# Patient Record
Sex: MALE | Race: WHITE | NOT HISPANIC OR LATINO | Employment: FULL TIME | URBAN - METROPOLITAN AREA
[De-identification: names, ages, dates, MRNs, and addresses within clinical notes are randomized per-mention and may not be internally consistent; named-entity substitution may affect disease eponyms.]

---

## 2017-03-13 ENCOUNTER — HOSPITAL ENCOUNTER (EMERGENCY)
Facility: MEDICAL CENTER | Age: 52
End: 2017-03-13
Attending: EMERGENCY MEDICINE
Payer: MEDICAID

## 2017-03-13 ENCOUNTER — APPOINTMENT (OUTPATIENT)
Dept: RADIOLOGY | Facility: MEDICAL CENTER | Age: 52
End: 2017-03-13
Attending: EMERGENCY MEDICINE
Payer: MEDICAID

## 2017-03-13 VITALS
BODY MASS INDEX: 32.79 KG/M2 | TEMPERATURE: 97.6 F | DIASTOLIC BLOOD PRESSURE: 80 MMHG | SYSTOLIC BLOOD PRESSURE: 136 MMHG | HEART RATE: 72 BPM | WEIGHT: 229.06 LBS | HEIGHT: 70 IN | OXYGEN SATURATION: 96 % | RESPIRATION RATE: 16 BRPM

## 2017-03-13 DIAGNOSIS — S06.0X0A CONCUSSION, WITHOUT LOSS OF CONSCIOUSNESS, INITIAL ENCOUNTER: ICD-10-CM

## 2017-03-13 PROCEDURE — 700101 HCHG RX REV CODE 250: Performed by: EMERGENCY MEDICINE

## 2017-03-13 PROCEDURE — 304999 HCHG REPAIR-SIMPLE/INTERMED LEVEL 1

## 2017-03-13 PROCEDURE — 70450 CT HEAD/BRAIN W/O DYE: CPT

## 2017-03-13 PROCEDURE — 303747 HCHG EXTRA SUTURE

## 2017-03-13 PROCEDURE — 304217 HCHG IRRIGATION SYSTEM

## 2017-03-13 PROCEDURE — 303485 HCHG DRESSING MEDIUM

## 2017-03-13 PROCEDURE — 99283 EMERGENCY DEPT VISIT LOW MDM: CPT

## 2017-03-13 RX ORDER — LIDOCAINE HYDROCHLORIDE AND EPINEPHRINE BITARTRATE 20; .01 MG/ML; MG/ML
10 INJECTION, SOLUTION SUBCUTANEOUS ONCE
Status: COMPLETED | OUTPATIENT
Start: 2017-03-13 | End: 2017-03-13

## 2017-03-13 RX ADMIN — LIDOCAINE HYDROCHLORIDE AND EPINEPHRINE 10 ML: 20; 10 INJECTION, SOLUTION INFILTRATION; PERINEURAL at 10:08

## 2017-03-13 ASSESSMENT — PAIN SCALES - GENERAL
PAINLEVEL_OUTOF10: 3
PAINLEVEL_OUTOF10: 0
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
## 2017-03-13 NOTE — ED AVS SNAPSHOT
Home Care Instructions                                                                                                                Saad Martin   MRN: [id number]    Department:  [facility], Emergency Dept   Date of Visit:  3/13/2017            [facility], Emergency Dept    [address]    [place] [address]    Phone:  [phone]      You were seen by     Kurtis Major M.D.      Your Diagnosis Was     Concussion, without loss of consciousness, initial encounter     S06.0X0A       Follow-up Information     1. Follow up with Pcp Pt States None. Schedule an appointment as soon as possible for a visit in 10 days.    Specialty:  Family Medicine    Why:  to remove sutures      Medication Information     Review all of your home medications and newly ordered medications with your primary doctor and/or pharmacist as soon as possible. Follow medication instructions as directed by your doctor and/or pharmacist.     Please keep your complete medication list with you and share with your physician. Update the information when medications are discontinued, doses are changed, or new medications (including over-the-counter products) are added; and carry medication information at all times in the event of emergency situations.               Medication List      Notice     You have not been prescribed any medications.            Procedures and tests performed during your visit     Procedure/Test Number of Times Performed    CT-HEAD W/O 1    NURSING COMMUNICATION 2        Discharge Instructions       Laceration Care    Keep wound dry 2 days, bandage with antibiotic ointment 3 days, keep it clean afterwards and protect the wound from sunburn 9 months.  See your physician, go to urgent care or return for infection and to remove running sutures/staples in 10-14 days. Avoid repeat concussion until all headache and concentration symptoms have resolved.    A laceration is a cut or  lesion that goes through all layers of the skin and into the tissue just beneath the skin.    This has been repaired by your caregiver. Your caregiver used either suturing, stapling, or steri-strips to repair the laceration. This brings the skin margins together. This allows faster healing.    HOME CARE INSTRUCTIONS  Ø If you were given a dressing, you should change it at least once a day, or as instructed by your caregiver. If the bandage sticks, soak it off with a solution of hydrogen peroxide or soapy water.  Ø Twice a day, wash the area with soap and water to remove all the creams or ointments if these were used. Rinse off the soap. Pat dry with a clean towel. Look for signs of infection (see below).  Ø Re-apply prescribed creams or ointments as instructed. This will help prevent infection. This also helps keep the bandage from sticking. Telfa over the wound and under the dressing or wrap will also help keep the bandage from sticking.  Ø If the bandage becomes wet, dirty, or develops a foul smell, change it as soon as possible.  Ø Acetaminophen (Tylenol®) or ibuprofen (Advil® or Motrin®) may be taken as directed for relief from pain and discomfort.  1   2 Seek medical attention IF:  Ø There is redness, swelling, increasing pain in the wound  Ø There is a red line that goes up your arm or leg.  Ø Pus is coming from wound.  Ø You develop an unexplained oral temperature above 101.  Ø You notice a foul smell coming from the wound or dressing.  Ø There is a breaking open of the wound  (edges not staying together) after sutures have been removed.    If you did not receive a tetanus shot today because you did not recall when your last one was given, make sure to check with your caregiver when you have your sutures removed to determine if you need one.      ExitCare® Patient Information ©2007 [employer].              Patient Information     Patient Information    Following emergency treatment: all patient requiring  follow-up care must return either to a private physician or a clinic if your condition worsens before you are able to obtain further medical attention, please return to the emergency room.     Billing Information    At [facility], we work to make the billing process streamlined for our patients.  Our Representatives are here to answer any questions you may have regarding your hospital bill.  If you have insurance coverage and have supplied your insurance information to us, we will submit a claim to your insurer on your behalf.  Should you have any questions regarding your bill, we can be reached online or by phone as follows:  Online: You are able pay your bills online or live chat with our representatives about any billing questions you may have. We are here to help Monday - Friday from 8:00am to 7:30pm and 9:00am - 12:00pm on Saturdays.  Please visit [email or web address]  for more information.   Phone:  [phone] or [phone]    Please note that your emergency physician, surgeon, pathologist, radiologist, anesthesiologist, and other specialists are not employed by [facility] and will therefore bill separately for their services.  Please contact them directly for any questions concerning their bills at the numbers below:     Emergency Physician Services:  [phone]  [facility]:  [phone]  [facility]:  [phone]  [facility]:  [phone]    1. Your final bill may vary from the amount quoted upon discharge if all procedures are not complete at that time, or if your doctor has additional procedures of which we are not aware. You will receive an additional bill if you return to the Emergency Department at [facility] for suture removal regardless of the facility of which the sutures were placed.     2. Please arrange for settlement of this account at the emergency registration.    3. All self-pay accounts are due in  full at the time of treatment.  If you are unable to meet this obligation then payment is expected within 4-5 days.     4. If you have had radiology studies (CT, X-ray, Ultrasound, MRI), you have received a preliminary result during your emergency department visit. Please contact the radiology department [phone] to receive a copy of your final result. Please discuss the Final result with your primary physician or with the follow up physician provided.     Crisis Hotline:  [place] Crisis Hotline:  5-922-JENBOHH or 1-764.113.8977  [place] Crisis Hotline:    1-436.927.3131 or 977-671-5202         ED Discharge Follow Up Questions    1. In order to provide you with very good care, we would like to follow up with a phone call in the next few days.  May we have your permission to contact you?     YES /  NO    2. What is the best phone number to call you? (       )_____-__________    3. What is the best time to call you?      Morning  /  Afternoon  /  Evening                   Patient Signature:  ____________________________________________________________    Date:  ____________________________________________________________

## 2017-03-13 NOTE — DISCHARGE INSTRUCTIONS
Laceration Care    Keep wound dry 2 days, bandage with antibiotic ointment 3 days, keep it clean afterwards and protect the wound from sunburn 9 months.  See your physician, go to urgent care or return for infection and to remove running sutures/staples in 10-14 days. Avoid repeat concussion until all headache and concentration symptoms have resolved.    A laceration is a cut or lesion that goes through all layers of the skin and into the tissue just beneath the skin.    This has been repaired by your caregiver. Your caregiver used either suturing, stapling, or steri-strips to repair the laceration. This brings the skin margins together. This allows faster healing.    HOME CARE INSTRUCTIONS  Ø If you were given a dressing, you should change it at least once a day, or as instructed by your caregiver. If the bandage sticks, soak it off with a solution of hydrogen peroxide or soapy water.  Ø Twice a day, wash the area with soap and water to remove all the creams or ointments if these were used. Rinse off the soap. Pat dry with a clean towel. Look for signs of infection (see below).  Ø Re-apply prescribed creams or ointments as instructed. This will help prevent infection. This also helps keep the bandage from sticking. Telfa over the wound and under the dressing or wrap will also help keep the bandage from sticking.  Ø If the bandage becomes wet, dirty, or develops a foul smell, change it as soon as possible.  Ø Acetaminophen (Tylenol®) or ibuprofen (Advil® or Motrin®) may be taken as directed for relief from pain and discomfort.  1   2 Seek medical attention IF:  Ø There is redness, swelling, increasing pain in the wound  Ø There is a red line that goes up your arm or leg.  Ø Pus is coming from wound.  Ø You develop an unexplained oral temperature above 101.  Ø You notice a foul smell coming from the wound or dressing.  Ø There is a breaking open of the wound  (edges not staying together) after sutures have been  removed.    If you did not receive a tetanus shot today because you did not recall when your last one was given, make sure to check with your caregiver when you have your sutures removed to determine if you need one.      [employer]® Patient Information ©2007 [employer].

## 2017-03-13 NOTE — ED PROVIDER NOTES
"ED Provider Note    CHIEF COMPLAINT   Chief Complaint   Patient presents with   • Laceration     rt arm       HPI   Saad Martin is a 51 y.o. male who presents with a laceration to his right dominant dorsal forearm inflicted by a piece of metal he walked by and his garage today. Bleeding controlled. Tetanus up-to-date. He shouldn't also struck his head a week ago on his sign and was dazed but did not lose consciousness. He's developed bruising around his left eye and had persistent moderately severe headache and concentration difficulties since. No nausea or vomiting. He was never evaluated medically. No blood that her use. Denies other injury.    REVIEW OF SYSTEMS   Pertinent positives include: Laceration, head injury.  Pertinent negatives include: Neck pain, back pain, chest pain, abdominal pain, weakness, numbness, ongoing bleeding.   10+ systems reviewed and negative.    PAST MEDICAL HISTORY   Past Medical History   Diagnosis Date   • Hypertension        CURRENT MEDICATIONS   None    ALLERGIES   Allergies   Allergen Reactions   • Erythromycin    • Pcn [Penicillins]        PHYSICAL EXAM  VITAL SIGNS: /90 mmHg  Pulse 94  Temp(Src) 36.4 °C (97.6 °F) (Temporal)  Resp 18  Ht 1.778 m (5' 10\")  Wt 103.9 kg (229 lb 0.9 oz)  BMI 32.87 kg/m2  SpO2 96%  Constitutional: Well developed, Well nourished, levator blood pressure.   HENT: Left periorbital ecchymosis and a scab over his left eyebrow, no hemotympanum or CSF leaks, face symmetric, oropharynx moist  EYE:  Pupils 3 and reactive extraocular movements intact, no subchondral conjunctival hemorrhage  Respiratory: Rate and excursion normal., No rales, rhonchi, wheeze  Cardiovascular: Regular S1-S2 without murmur, rub, gallop  Gastrointestinal;  soft, nontender, nondistended  Musculoskeletal: No spine tenderness. No bony tenderness of the arms or legs. No deformity.  Skin: 3-1/2 cm laceration longitudinal down the mid dorsal right forearm into " subcutaneous fat without evidence of foreign body vascular or muscular injury..  Neurologic: Finger flexion extension abduction and thumb opposition intact. Sensation intact on the 5 fingers and the 1st dorsal web space of the hand. Cranial nerves II through XII are intact. Patient was all extremities with strength..     COURSE & MEDICAL DECISION MAKING  Patient anesthetized with local infiltration of 3 mL of 2% lidocaine with epinephrine. Wound irrigated with normal saline. Skin prepped with povidone. Center cell technique employed to close the wound with running 4-0 nylon sutures which patient tolerated well. Patient bandaged with antibiotic ointment.    CT-HEAD W/O   Final Result         1. No evidence of acute cerebral infarction, hemorrhage or mass lesion.        Well-appearing patient presents with a right dorsal forearm laceration sutured in one layer. He also has a recent concussion with left-sided raccoon eyes without evidence of skull fracture or intracranial hemorrhage. Patient could still have a basilar skull fracture. It is no evidence of significant spine torso or other extremity injury.    PLAN:   Concussion precautions  Laceration instructions  Suture removal 10-14 days    CONDITION: good    FINAL IMPRESSION  1. Concussion, without loss of consciousness, initial encounter    2. Laceration            Electronically signed by: Kurtis Major, 3/13/2017 9:57 AM

## 2017-03-13 NOTE — ED AVS SNAPSHOT
3/13/2017          Saad Martin  [address] [place] [id number]    Dear Saad:    [facility] wants to ensure your discharge home is safe and you or your loved ones have had all your questions answered regarding your care after you leave the hospital.    You may receive a telephone call within two days of your discharge.  This call is to make certain you understand your discharge instructions as well as ensure we provided you with the best care possible during your stay with us.     The call will only last approximately 3-5 minutes and will be done by a nurse.    Once again, we want to ensure your discharge home is safe and that you have a clear understanding of any next steps in your care.  If you have any questions or concerns, please do not hesitate to contact us, we are here for you.  Thank you for choosing [facility] for your healthcare needs.    Sincerely,    Nikhil Vaca    [facility]

## 2017-03-13 NOTE — ED AVS SNAPSHOT
enMarkit Access Code: [email or web address]  Expires: 4/12/2017 11:42 AM    enMarkit  A secure, online tool to manage your health information     [employer]’s enMarkit® is a secure, online tool that connects you to your personalized health information from the privacy of your home -- day or night - making it very easy for you to manage your healthcare. Once the activation process is completed, you can even access your medical information using the enMarkit harsha, which is available for free in the Apple Harsha store or Google Play store.     enMarkit provides the following levels of access (as shown below):   My Chart Features   [facility] Primary Care Doctor [facility]  Specialists [facility]  Urgent  Care Non-[facility]  Primary Care  Doctor   Email your healthcare team securely and privately 24/7 X X X X   Manage appointments: schedule your next appointment; view details of past/upcoming appointments X      Request prescription refills. X      View recent personal medical records, including lab and immunizations X X X X   View health record, including health history, allergies, medications X X X X   Read reports about your outpatient visits, procedures, consult and ER notes X X X X   See your discharge summary, which is a recap of your hospital and/or ER visit that includes your diagnosis, lab results, and care plan. X X       How to register for enMarkit:  1. Go to  [email or web address].  2. Click on the Sign Up Now box, which takes you to the New Member Sign Up page. You will need to provide the following information:  a. Enter your enMarkit Access Code exactly as it appears at the top of this page. (You will not need to use this code after you’ve completed the sign-up process. If you do not sign up before the expiration date, you must request a new code.)   b. Enter your date of birth.   c. Enter your home email address.   d. Click Submit, and follow the next screen’s instructions.  3. Create a enMarkit ID. This will be your enMarkit  login ID and cannot be changed, so think of one that is secure and easy to remember.  4. Create a [employer] password. You can change your password at any time.  5. Enter your Password Reset Question and Answer. This can be used at a later time if you forget your password.   6. Enter your e-mail address. This allows you to receive e-mail notifications when new information is available in [employer].  7. Click Sign Up. You can now view your health information.    For assistance activating your [employer] account, call [phone]

## 2019-04-01 ENCOUNTER — HOSPITAL ENCOUNTER (EMERGENCY)
Dept: HOSPITAL 8 - ED | Age: 54
Discharge: HOME | End: 2019-04-01
Payer: SELF-PAY

## 2019-04-01 VITALS — WEIGHT: 239.42 LBS | HEIGHT: 70 IN | BODY MASS INDEX: 34.28 KG/M2

## 2019-04-01 VITALS — DIASTOLIC BLOOD PRESSURE: 92 MMHG | SYSTOLIC BLOOD PRESSURE: 149 MMHG
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
## 2019-04-01 DIAGNOSIS — S76.912A: Primary | ICD-10-CM
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
## 2019-04-01 DIAGNOSIS — Z88.0: ICD-10-CM

## 2019-04-01 PROCEDURE — 99283 EMERGENCY DEPT VISIT LOW MDM: CPT

## 2022-07-26 ENCOUNTER — HOSPITAL ENCOUNTER (OUTPATIENT)
Dept: RADIOLOGY | Facility: MEDICAL CENTER | Age: 57
End: 2022-07-26
Attending: NURSE PRACTITIONER
Payer: MEDICAID

## 2022-07-26 ENCOUNTER — TELEPHONE (OUTPATIENT)
Dept: URGENT CARE | Facility: CLINIC | Age: 57
End: 2022-07-26

## 2022-07-26 ENCOUNTER — OFFICE VISIT (OUTPATIENT)
Dept: URGENT CARE | Facility: CLINIC | Age: 57
End: 2022-07-26
Payer: COMMERCIAL

## 2022-07-26 ENCOUNTER — HOSPITAL ENCOUNTER (OUTPATIENT)
Dept: LAB | Facility: MEDICAL CENTER | Age: 57
End: 2022-07-26
Attending: NURSE PRACTITIONER
Payer: MEDICAID

## 2022-07-26 VITALS
RESPIRATION RATE: 16 BRPM | OXYGEN SATURATION: 98 % | BODY MASS INDEX: 32.93 KG/M2 | WEIGHT: 230 LBS | HEART RATE: 85 BPM | SYSTOLIC BLOOD PRESSURE: 120 MMHG | HEIGHT: 70 IN | DIASTOLIC BLOOD PRESSURE: 84 MMHG | TEMPERATURE: 98.3 F

## 2022-07-26 DIAGNOSIS — M79.672 LEFT FOOT PAIN: ICD-10-CM

## 2022-07-26 DIAGNOSIS — M10.9 ACUTE GOUT OF LEFT FOOT, UNSPECIFIED CAUSE: ICD-10-CM

## 2022-07-26 LAB
ALBUMIN SERPL BCP-MCNC: 4.2 G/DL (ref 3.2–4.9)
ALBUMIN/GLOB SERPL: 1.3 G/DL
ALP SERPL-CCNC: 52 U/L (ref 30–99)
ALT SERPL-CCNC: 28 U/L (ref 2–50)
ANION GAP SERPL CALC-SCNC: 10 MMOL/L (ref 7–16)
AST SERPL-CCNC: 20 U/L (ref 12–45)
BASOPHILS # BLD AUTO: 0.3 % (ref 0–1.8)
BASOPHILS # BLD: 0.02 K/UL (ref 0–0.12)
BILIRUB SERPL-MCNC: 0.5 MG/DL (ref 0.1–1.5)
BUN SERPL-MCNC: 19 MG/DL (ref 8–22)
CALCIUM SERPL-MCNC: 9.1 MG/DL (ref 8.5–10.5)
CHLORIDE SERPL-SCNC: 103 MMOL/L (ref 96–112)
CO2 SERPL-SCNC: 25 MMOL/L (ref 20–33)
CREAT SERPL-MCNC: 0.98 MG/DL (ref 0.5–1.4)
EOSINOPHIL # BLD AUTO: 0 K/UL (ref 0–0.51)
EOSINOPHIL NFR BLD: 0 % (ref 0–6.9)
ERYTHROCYTE [DISTWIDTH] IN BLOOD BY AUTOMATED COUNT: 44.2 FL (ref 35.9–50)
ERYTHROCYTE [SEDIMENTATION RATE] IN BLOOD BY WESTERGREN METHOD: 11 MM/HOUR (ref 0–20)
GFR SERPLBLD CREATININE-BSD FMLA CKD-EPI: 91 ML/MIN/1.73 M 2
GLOBULIN SER CALC-MCNC: 3.3 G/DL (ref 1.9–3.5)
GLUCOSE SERPL-MCNC: 129 MG/DL (ref 65–99)
HCT VFR BLD AUTO: 44.7 % (ref 42–52)
HGB BLD-MCNC: 14.6 G/DL (ref 14–18)
IMM GRANULOCYTES # BLD AUTO: 0.03 K/UL (ref 0–0.11)
IMM GRANULOCYTES NFR BLD AUTO: 0.4 % (ref 0–0.9)
LYMPHOCYTES # BLD AUTO: 1.09 K/UL (ref 1–4.8)
LYMPHOCYTES NFR BLD: 15 % (ref 22–41)
MCH RBC QN AUTO: 28.7 PG (ref 27–33)
MCHC RBC AUTO-ENTMCNC: 32.7 G/DL (ref 33.7–35.3)
MCV RBC AUTO: 87.8 FL (ref 81.4–97.8)
MONOCYTES # BLD AUTO: 0.44 K/UL (ref 0–0.85)
MONOCYTES NFR BLD AUTO: 6 % (ref 0–13.4)
NEUTROPHILS # BLD AUTO: 5.71 K/UL (ref 1.82–7.42)
NEUTROPHILS NFR BLD: 78.3 % (ref 44–72)
NRBC # BLD AUTO: 0 K/UL
NRBC BLD-RTO: 0 /100 WBC
PLATELET # BLD AUTO: 241 K/UL (ref 164–446)
PMV BLD AUTO: 11.5 FL (ref 9–12.9)
POTASSIUM SERPL-SCNC: 4.4 MMOL/L (ref 3.6–5.5)
PROT SERPL-MCNC: 7.5 G/DL (ref 6–8.2)
RBC # BLD AUTO: 5.09 M/UL (ref 4.7–6.1)
SODIUM SERPL-SCNC: 138 MMOL/L (ref 135–145)
URATE SERPL-MCNC: 5.8 MG/DL (ref 2.5–8.3)
WBC # BLD AUTO: 7.3 K/UL (ref 4.8–10.8)

## 2022-07-26 PROCEDURE — 36415 COLL VENOUS BLD VENIPUNCTURE: CPT

## 2022-07-26 PROCEDURE — 99204 OFFICE O/P NEW MOD 45 MIN: CPT | Performed by: NURSE PRACTITIONER

## 2022-07-26 PROCEDURE — 85652 RBC SED RATE AUTOMATED: CPT

## 2022-07-26 PROCEDURE — 85025 COMPLETE CBC W/AUTO DIFF WBC: CPT

## 2022-07-26 PROCEDURE — 73630 X-RAY EXAM OF FOOT: CPT | Mod: LT

## 2022-07-26 PROCEDURE — 80053 COMPREHEN METABOLIC PANEL: CPT

## 2022-07-26 PROCEDURE — 84550 ASSAY OF BLOOD/URIC ACID: CPT

## 2022-07-26 RX ORDER — INDOMETHACIN 50 MG/1
50 CAPSULE ORAL 3 TIMES DAILY
Qty: 90 CAPSULE | Refills: 0 | Status: SHIPPED | OUTPATIENT
Start: 2022-07-26

## 2022-07-26 ASSESSMENT — ENCOUNTER SYMPTOMS
NAUSEA: 0
MYALGIAS: 0
SHORTNESS OF BREATH: 0
EYE REDNESS: 0
SORE THROAT: 0
FEVER: 0
VOMITING: 0
DIZZINESS: 0
CHILLS: 0
JOINT SWELLING: 1

## 2022-07-26 NOTE — PROGRESS NOTES
Subjective:   Saad Martin is a 57 y.o. male who presents for Gout (Started Friday, went to ER Sunday, given prednisone, still having pretty bad swelling/)      Foot Problem  This is a new problem. The current episode started in the past 7 days (was seen in outside facility , diagnosed with gout prescribed prednisone having no improvement.  No known injury). The problem occurs constantly. The problem has been gradually worsening. Associated symptoms include joint swelling and a rash (Foot red, bruising). Pertinent negatives include no chest pain, chills, fever, myalgias, nausea, sore throat or vomiting. The symptoms are aggravated by walking. Treatments tried: Prednisone. The treatment provided no relief.   Uncertain if he has a history of gout has had intermittent bouts of atraumatic foot pain which subsides after couple days.    Review of Systems   Constitutional: Negative for chills and fever.   HENT: Negative for sore throat.    Eyes: Negative for redness.   Respiratory: Negative for shortness of breath.    Cardiovascular: Negative for chest pain.   Gastrointestinal: Negative for nausea and vomiting.   Genitourinary: Negative for dysuria.   Musculoskeletal: Positive for joint pain and joint swelling. Negative for myalgias.   Skin: Positive for rash (Foot red, bruising).   Neurological: Negative for dizziness.       Medications:    • indomethacin Caps    Allergies: Erythromycin and Pcn [penicillins]    Problem List: Saad Martin does not have a problem list on file.    Surgical History:  No past surgical history on file.    Past Social Hx: Saad Martin  reports that he has never smoked. His smokeless tobacco use includes chew. He reports current alcohol use. He reports that he does not use drugs.     Past Family Hx:  Saad Martin family history is not on file.     Problem list, medications, and allergies reviewed by myself today in Epic.     Objective:     /84    "Pulse 85   Temp 36.8 °C (98.3 °F) (Temporal)   Resp 16   Ht 1.765 m (5' 9.5\")   Wt 104 kg (230 lb)   SpO2 98%   BMI 33.48 kg/m²     Physical Exam  Constitutional:       Appearance: Normal appearance. He is not ill-appearing or toxic-appearing.   HENT:      Head: Normocephalic.      Right Ear: External ear normal.      Left Ear: External ear normal.      Nose: Nose normal.      Mouth/Throat:      Lips: Pink.      Mouth: Mucous membranes are moist.   Eyes:      General: Lids are normal.         Right eye: No discharge.         Left eye: No discharge.   Pulmonary:      Effort: Pulmonary effort is normal. No accessory muscle usage or respiratory distress.   Musculoskeletal:         General: Normal range of motion.      Cervical back: Full passive range of motion without pain.      Left ankle: Swelling present. No deformity. No tenderness.      Left Achilles Tendon: Normal.      Left foot: Normal capillary refill. Swelling, tenderness and bony tenderness present. No crepitus. Normal pulse.      Comments: 1st MTp erythematous, edematous, tender to palpation, erythema on the dorsal aspect with ecchymosis.   Skin:     Coloration: Skin is not pale.   Neurological:      Mental Status: He is alert and oriented to person, place, and time.   Psychiatric:         Mood and Affect: Mood normal.         Thought Content: Thought content normal.         Assessment/Plan:     Diagnosis and associated orders:     1. Left foot pain  DX-FOOT-COMPLETE 3+ LEFT    indomethacin (INDOCIN) 50 MG Cap    CBC WITH DIFFERENTIAL    Comp Metabolic Panel    URIC ACID    Sed Rate   2. Acute gout of left foot, unspecified cause          Comments/MDM:       • I independently reviewed the patient's imaging and agree with the interpretation of the radiologist.    IMPRESSION:  1.  No acute fracture or malalignment.  2.  Degenerative changes of the foot. No evidence of erosive arthropathy or soft tissue calcification.     I personally reviewed prior " external notes and prior test results pertinent to today's visit.  Patient is not tolerating oral prednisone advised to discontinue will trial anti-inflammatory will obtain diagnostic labs to evaluate kidney function, white blood cell count, uric acid.  X-ray order provided as x-ray imaging unavailable claudication follow-up with results.  Patient was provided crutches encouraged to rest, ice, elevate when at rest.  Ace bandage used for compression.  Discussed management options, risks and benefits, and alternatives to treatment plan agreed upon.   Red flags discussed and indications to immediately call 911 or present to the Emergency Department.   Supportive care, differential diagnoses, and indications for immediate follow-up discussed with patient.    • Patient expresses understanding and agrees to plan. Patient denies any other questions or concerns.         Telephone encounter 7/27 1119: Called and discussed x-ray results and lab review with patient kidney function adequate, inflammatory markers normal, x-ray with some degenerative changes patient not having worsening pain has not started the prescribed medication as he is not certain where medication was prescribed to.  Recommended starting that today.  Continue rest ice elevation.  He will establish care with a PCP for further treatment  Differential diagnosis, natural history, supportive care, and indications for immediate follow-up discussed.        My total time spent caring for the patient on the day of the encounter was 45 minutes.   This does not include time spent on separately billable procedures/tests.      Please note that this dictation was created using voice recognition software. I have made a reasonable attempt to correct obvious errors, but I expect that there are errors of grammar and possibly content that I did not discover before finalizing the note.    This note was electronically signed by Chidi URBAN.